# Patient Record
Sex: MALE | Race: WHITE | URBAN - METROPOLITAN AREA
[De-identification: names, ages, dates, MRNs, and addresses within clinical notes are randomized per-mention and may not be internally consistent; named-entity substitution may affect disease eponyms.]

---

## 2022-04-06 ENCOUNTER — OFFICE VISIT (OUTPATIENT)
Dept: PODIATRY | Age: 47
End: 2022-04-06
Payer: COMMERCIAL

## 2022-04-06 VITALS
BODY MASS INDEX: 24.27 KG/M2 | SYSTOLIC BLOOD PRESSURE: 127 MMHG | HEIGHT: 75 IN | TEMPERATURE: 97.8 F | WEIGHT: 195.2 LBS | HEART RATE: 84 BPM | DIASTOLIC BLOOD PRESSURE: 86 MMHG

## 2022-04-06 DIAGNOSIS — M79.89 SOFT TISSUE MASS: Primary | ICD-10-CM

## 2022-04-06 PROCEDURE — 99203 OFFICE O/P NEW LOW 30 MIN: CPT | Performed by: PODIATRIST

## 2022-04-06 RX ORDER — ROSUVASTATIN CALCIUM 10 MG/1
10 TABLET, COATED ORAL DAILY
COMMUNITY
Start: 2022-03-28

## 2022-04-06 RX ORDER — AMLODIPINE BESYLATE 5 MG/1
5 TABLET ORAL DAILY
COMMUNITY
Start: 2022-03-28

## 2022-04-06 RX ORDER — GABAPENTIN 300 MG/1
300 CAPSULE ORAL DAILY
COMMUNITY
Start: 2022-02-24

## 2022-04-06 RX ORDER — LISINOPRIL 40 MG/1
40 TABLET ORAL DAILY
COMMUNITY
Start: 2022-03-30

## 2022-04-06 NOTE — PROGRESS NOTES
1. Have you been to the ER, urgent care clinic since your last visit? Hospitalized since your last visit? No    2. Have you seen or consulted any other health care providers outside of the [address] [place] [address] since your last visit? Include any pap smears or colon screening.  No     Chief Complaint   Patient presents with    Cyst     R foot cyst between great toe and 2nd toe

## 2022-04-15 ENCOUNTER — HOSPITAL ENCOUNTER (OUTPATIENT)
Dept: MRI IMAGING | Age: 47
End: 2022-04-15
Attending: PODIATRIST

## 2022-05-05 NOTE — PROGRESS NOTES
[facility]    Subjective:         Patient is a 55 y.o. male who is being seen as a new pt for a soft tissue mass noted between the right great and second toes. Patient denies any associated pain or trauma. He denies any breaks in skin or local/systemic signs of infection. He states the mass has slowly grown over time. He denies any diagnostic testing to confirm a diagnosis. He denies any changes in his activity level or shoe gear. He denies any other pedal complaints    Past Medical History:   Diagnosis Date    Cervical spine pain     Left hip pain     Low back pain     Lumbar spine pain     Neck pain      Past Surgical History:   Procedure Laterality Date    HX REFRACTIVE SURGERY         Family History   Problem Relation Age of Onset    Heart Disease Other     OSTEOARTHRITIS Other       Social History     Tobacco Use    Smoking status: Never Smoker    Smokeless tobacco: Current User   Substance Use Topics    Alcohol use: Yes     Alcohol/week: 0.0 standard drinks     No Known Allergies  Prior to Admission medications    Medication Sig Start Date End Date Taking? Authorizing Provider   lisinopriL (PRINIVIL, ZESTRIL) 40 mg tablet Take 40 mg by mouth daily. 3/30/22  Yes Provider, Historical   amLODIPine (NORVASC) 5 mg tablet Take 5 mg by mouth daily. 3/28/22  Yes Provider, Historical   rosuvastatin (CRESTOR) 10 mg tablet Take 10 mg by mouth daily. 3/28/22  Yes Provider, Historical   gabapentin (NEURONTIN) 300 mg capsule Take 300 mg by mouth daily. 2/24/22  Yes Provider, Historical   diclofenac EC (VOLTAREN) 75 mg EC tablet Take 1 Tab by mouth two (2) times a day. 12/18/15  Yes Amina Guzman MD   cephALEXin (KEFLEX) 500 mg capsule  10/19/15   Provider, Historical   HYDROcodone-acetaminophen (NORCO) 5-325 mg per tablet  10/19/15   Provider, Historical   cyclobenzaprine (FLEXERIL) 10 mg tablet 10 mg.   Patient not taking: Reported on 4/6/2022    Provider, Historical diclofenac EC (VOLTAREN) 50 mg EC tablet 50 mg. Patient not taking: Reported on 4/6/2022    Provider, Historical       Review of Systems   Constitutional: Negative. HENT: Negative. Eyes: Negative. Respiratory: Negative. Cardiovascular: Negative. Gastrointestinal: Negative. Endocrine: Negative. Genitourinary: Negative. Musculoskeletal: Positive for arthralgias, back pain and neck pain. Skin: Negative. Allergic/Immunologic: Negative. Neurological: Positive for numbness. Hematological: Negative. Psychiatric/Behavioral: Negative. All other systems reviewed and are negative. Objective:     Visit Vitals  /86 (BP 1 Location: Left upper arm, BP Patient Position: Sitting, BP Cuff Size: Adult)   Pulse 84   Temp 97.8 °F (36.6 °C) (Temporal)   Ht 6' 3\" (1.905 m)   Wt 195 lb 3.2 oz (88.5 kg)   BMI 24.40 kg/m²       Physical Exam  Vitals reviewed. Constitutional:       Appearance: He is normal weight. Cardiovascular:      Pulses:           Dorsalis pedis pulses are 2+ on the right side and 2+ on the left side. Posterior tibial pulses are 2+ on the right side and 2+ on the left side. Pulmonary:      Effort: Pulmonary effort is normal.   Musculoskeletal:      Right lower leg: No edema. Left lower leg: No edema. Right foot: Normal range of motion. No deformity or bunion. Left foot: Normal range of motion. No deformity or bunion. Feet:      Right foot:      Protective Sensation: 10 sites tested. 10 sites sensed. Skin integrity: Skin integrity normal.      Toenail Condition: Right toenails are normal.      Left foot:      Protective Sensation: 10 sites tested. 10 sites sensed. Skin integrity: Skin integrity normal.      Toenail Condition: Left toenails are normal.      Comments: Soft tissue mass noted between the right great and second toe.   No breaks in skin or clinical signs infection noted  Lymphadenopathy:      Lower Body: No right inguinal adenopathy. No left inguinal adenopathy. Skin:     General: Skin is warm. Capillary Refill: Capillary refill takes 2 to 3 seconds. Neurological:      Mental Status: He is alert and oriented to person, place, and time. Psychiatric:         Mood and Affect: Mood and affect normal.         Behavior: Behavior is cooperative. Data Review: No results found for this or any previous visit (from the past 24 hour(s)). Impression:       ICD-10-CM ICD-9-CM    1. Soft tissue mass  M79.89 729.99 MRI FOOT RT W WO CONT      RENAL FUNCTION PANEL         Recommendation:     Patient seen and evaluated in the office  Discussed and educated patient regarding his current medical condition  Due to the findings during his physical exam, I believe advanced imaging is warranted. A prescription was given for an MRI to be performed of the right foot with and without contrast.  He was also given a prescription for kidney function test to be performed prior to receiving IV contrast.  Once imaging has been performed, will discuss treatment options in more depth. Patient verbalized understanding        Gayla Tee, [facility], [address] [place] [address] and [facility]  [address] [place] [address]  O: [phone]  F: [phone]  C: [phone]